# Patient Record
Sex: FEMALE | Race: WHITE | URBAN - NONMETROPOLITAN AREA
[De-identification: names, ages, dates, MRNs, and addresses within clinical notes are randomized per-mention and may not be internally consistent; named-entity substitution may affect disease eponyms.]

---

## 2021-04-08 ENCOUNTER — IMMUNIZATION (OUTPATIENT)
Dept: PRIMARY CARE CLINIC | Age: 86
End: 2021-04-08
Payer: MEDICARE

## 2021-04-08 PROCEDURE — 91300 COVID-19, PFIZER VACCINE 30MCG/0.3ML DOSE: CPT

## 2021-04-08 PROCEDURE — 0001A COVID-19, PFIZER VACCINE 30MCG/0.3ML DOSE: CPT

## 2021-04-29 ENCOUNTER — IMMUNIZATION (OUTPATIENT)
Dept: PRIMARY CARE CLINIC | Age: 86
End: 2021-04-29
Payer: MEDICARE

## 2021-04-29 PROCEDURE — 91300 COVID-19, PFIZER VACCINE 30MCG/0.3ML DOSE: CPT | Performed by: FAMILY MEDICINE

## 2021-04-29 PROCEDURE — 0002A COVID-19, PFIZER VACCINE 30MCG/0.3ML DOSE: CPT | Performed by: FAMILY MEDICINE

## 2025-06-19 ENCOUNTER — HOSPITAL ENCOUNTER (EMERGENCY)
Age: 89
Discharge: HOME | End: 2025-06-19
Payer: MEDICARE

## 2025-06-19 VITALS
HEART RATE: 77 BPM | DIASTOLIC BLOOD PRESSURE: 70 MMHG | SYSTOLIC BLOOD PRESSURE: 148 MMHG | OXYGEN SATURATION: 92 % | TEMPERATURE: 98.1 F | RESPIRATION RATE: 15 BRPM

## 2025-06-19 DIAGNOSIS — S81.812A SKIN TEAR OF LEFT LOWER LEG WITHOUT COMPLICATION, INITIAL ENCOUNTER: Primary | ICD-10-CM

## 2025-06-19 PROCEDURE — 90471 IMMUNIZATION ADMIN: CPT

## 2025-06-19 PROCEDURE — 6360000002 HC RX W HCPCS

## 2025-06-19 PROCEDURE — 90715 TDAP VACCINE 7 YRS/> IM: CPT

## 2025-06-19 PROCEDURE — 99284 EMERGENCY DEPT VISIT MOD MDM: CPT

## 2025-06-19 RX ORDER — CEPHALEXIN 500 MG/1
500 CAPSULE ORAL 3 TIMES DAILY
Qty: 15 CAPSULE | Refills: 0 | Status: SHIPPED | OUTPATIENT
Start: 2025-06-19 | End: 2025-06-24

## 2025-06-19 RX ADMIN — TETANUS TOXOID, REDUCED DIPHTHERIA TOXOID AND ACELLULAR PERTUSSIS VACCINE, ADSORBED 0.5 ML: 5; 2.5; 8; 8; 2.5 SUSPENSION INTRAMUSCULAR at 14:57

## 2025-06-19 NOTE — DISCHARGE INSTRUCTIONS
.What Was Done in the ED:  The area was cleaned carefully to prevent infection.  The skin was repositioned, and steri-stripped   A special dressing was applied  A tetanus shot was given    Home Care Instructions:  Wound Care:  Do not remove the dressing for the first day  Keep the dressing clean and dry  Clean gently with saline or mild soap and water.    Pain Management:  Take acetaminophen (Tylenol) as needed for discomfort.    Watch For Signs of Infection:  Call your doctor or return to the ED if you notice:  Redness or swelling spreading around the wound  Pus or foul-smelling drainage  Increased pain or warmth  Fever over 100.4°F (38°C)    Activity:  Avoid bumping or stretching the affected leg.  Be careful with clothing or bed sheets pulling on the wound area.  Use assistive devices (walker, cane) as needed to prevent falls.    Follow-Up:  Follow up with your primary care provider in 3-5 days  Return sooner if the wound worsens or shows signs of infection.    When to Return to the ER Immediately:  Bleeding that won’t stop with gentle pressure  Fever or chills  Wound opening up significantly  Signs of infection (see above)

## 2025-06-19 NOTE — ED TRIAGE NOTES
Patient presents with concerns of avulsion to the left lateral aspect of her left leg. States she was getting into the car and opened the car door and the bottom of the car door hit her leg.